# Patient Record
Sex: MALE | Race: WHITE | NOT HISPANIC OR LATINO | Employment: FULL TIME | URBAN - METROPOLITAN AREA
[De-identification: names, ages, dates, MRNs, and addresses within clinical notes are randomized per-mention and may not be internally consistent; named-entity substitution may affect disease eponyms.]

---

## 2024-10-25 ENCOUNTER — OFFICE VISIT (OUTPATIENT)
Dept: ORTHOPEDIC SURGERY | Facility: CLINIC | Age: 59
End: 2024-10-25
Payer: COMMERCIAL

## 2024-10-25 VITALS
HEART RATE: 80 BPM | WEIGHT: 210 LBS | OXYGEN SATURATION: 96 % | SYSTOLIC BLOOD PRESSURE: 160 MMHG | DIASTOLIC BLOOD PRESSURE: 90 MMHG

## 2024-10-25 DIAGNOSIS — M25.512 LEFT SHOULDER PAIN, UNSPECIFIED CHRONICITY: ICD-10-CM

## 2024-10-25 DIAGNOSIS — M75.42 IMPINGEMENT SYNDROME OF LEFT SHOULDER: Primary | ICD-10-CM

## 2024-10-25 RX ORDER — ROSUVASTATIN CALCIUM 5 MG/1
TABLET, COATED ORAL
COMMUNITY

## 2024-10-25 RX ORDER — LOSARTAN POTASSIUM 50 MG/1
TABLET ORAL
COMMUNITY

## 2024-10-25 RX ORDER — AMLODIPINE BESYLATE 2.5 MG/1
TABLET ORAL
COMMUNITY

## 2024-10-25 RX ORDER — IBUPROFEN 800 MG/1
800 TABLET, FILM COATED ORAL EVERY 6 HOURS PRN
COMMUNITY

## 2024-10-25 RX ORDER — PANTOPRAZOLE SODIUM 40 MG/1
40 TABLET, DELAYED RELEASE ORAL DAILY
COMMUNITY

## 2024-10-25 RX ADMIN — TRIAMCINOLONE ACETONIDE 40 MG: 40 INJECTION, SUSPENSION INTRA-ARTICULAR; INTRAMUSCULAR at 16:27

## 2024-10-25 RX ADMIN — LIDOCAINE HYDROCHLORIDE 5 ML: 10 INJECTION, SOLUTION INFILTRATION; PERINEURAL at 16:27

## 2024-10-25 NOTE — PROGRESS NOTES
Chief Complaint  Initial Evaluation of the Left Shoulder       Subjective      Luis Perry presents to [facility] for an evaluation  of his left shoulder. He has been having pain to his left shoulder for several months. He reports no specific injury, trauma, falls or surgery on his left shoulder. He has pain with overhead range of motion, lifting, pulling and reaching with his shoulder. He has been taking over the counter medications for pain control.      Allergies   Allergen Reactions    Cefdinir Rash, Swelling and Unknown (See Comments)    Penicillins Hives, Itching, Rash, Shortness Of Breath, Swelling and Unknown (See Comments)        Social History     Socioeconomic History    Marital status:    Tobacco Use    Smoking status: Never    Smokeless tobacco: Never   Vaping Use    Vaping status: Never Used   Substance and Sexual Activity    Alcohol use: Yes     Comment: social    Sexual activity: Defer        I reviewed the patient's chief complaint, history of present illness, review of systems, past medical history, surgical history, family history, social history, medications, and allergy list.     Review of Systems     Constitutional: Denies fevers, chills, weight loss  Cardiovascular: Denies chest pain, shortness of breath  Skin: Denies rashes, acute skin changes  Neurologic: Denies headache, loss of consciousness  MSK: Left shoulder pain       Vital Signs:   /90   Pulse 80   Wt 95.3 kg (210 lb)   SpO2 96%          Physical Exam  General: Alert. No acute distress    Ortho Exam        Left upper extremity: tender over the biceps and AC joint, forward elevation  to 150 degrees, 4 plus supraspinatus strength, 5/5 infraspinatus and subscap, positive impingement, mild pain with cross arm, neurovascularly intact, sensation intact to the medial, radial and ulnar nerve, negative  Crestline,     Large Joint: L subacromial bursa  Date/Time: 10/25/2024 4:27 PM  Consent  given by: patient  Site marked: site marked  Timeout: Immediately prior to procedure a time out was called to verify the correct patient, procedure, equipment, support staff and site/side marked as required   Supporting Documentation  Indications: pain   Procedure Details  Location: shoulder - L subacromial bursa  Preparation: Patient was prepped and draped in the usual sterile fashion  Needle gauge: 21g.  Medications administered: 5 mL lidocaine 1 %; 40 mg triamcinolone acetonide 40 MG/ML  Patient tolerance: patient tolerated the procedure well with no immediate complications    This injection documentation was Scribed for James Garcia MD by Lianne Wong MA.  10/25/24   16:27 EDT        Imaging Results (Most Recent)       Procedure Component Value Units Date/Time    XR Scapula Left [[id number]] Resulted: 10/25/24 1602     Updated: 10/25/24 1605             Result Review :     X-Ray Report:  Left scapula X-Ray  Indication: Evaluation of left shoulder pain   AP/Lateral view(s)  Findings: mild degenerative changes to the glenohumeral joint, moderate degenerative changes to the AC, no acute fracture, no dislocation   Prior studies available for comparison: no       No results found.           Assessment and Plan     Diagnoses and all orders for this visit:    1. Left shoulder pain, unspecified chronicity (Primary)  -     XR Scapula Left    2. Impingement syndrome of left shoulder      The patient presents here today for an evaluation  of his left shoulder. X-rays were obtained in the office today and these were reviewed today.     Discussed the risks and benefits of a left shoulder steroid injection today in the office. Patient expressed understanding and wishes to proceed. He tolerated the injection well and without any complications.      Home exercises given today and will continue over the counter medications.     May consider an MRI in the future if symptoms persist.     Call or return if worsening  symptoms.    Follow Up     4 - 6 weeks       Patient was given instructions and counseling regarding his condition or for health maintenance advice. Please see specific information pulled into the AVS if appropriate.     Scribed for James Garcia MD by Orin Sorto.  10/25/24   16:11 EDT    I have personally performed the services described in this document as scribed by the above individual and it is both accurate and complete. James Garcia MD 10/28/24

## 2024-10-28 RX ORDER — TRIAMCINOLONE ACETONIDE 40 MG/ML
40 INJECTION, SUSPENSION INTRA-ARTICULAR; INTRAMUSCULAR
Status: COMPLETED | OUTPATIENT
Start: 2024-10-25 | End: 2024-10-25

## 2024-10-28 RX ORDER — LIDOCAINE HYDROCHLORIDE 10 MG/ML
5 INJECTION, SOLUTION INFILTRATION; PERINEURAL
Status: COMPLETED | OUTPATIENT
Start: 2024-10-25 | End: 2024-10-25

## 2024-11-11 ENCOUNTER — TELEPHONE (OUTPATIENT)
Dept: ORTHOPEDIC SURGERY | Facility: CLINIC | Age: 59
End: 2024-11-11

## 2024-11-11 NOTE — TELEPHONE ENCOUNTER
"Name: Luis Perry \"Bossman\"      Relationship: Self      Best Callback Number: [phone]      HUB PROVIDED THE RELAY MESSAGE FROM THE OFFICE      PATIENT: VOICED UNDERSTANDING AND HAS NO FURTHER QUESTIONS AT THIS TIME    ADDITIONAL INFORMATION:   "

## 2024-12-09 ENCOUNTER — OFFICE VISIT (OUTPATIENT)
Dept: ORTHOPEDIC SURGERY | Facility: CLINIC | Age: 59
End: 2024-12-09
Payer: COMMERCIAL

## 2024-12-09 VITALS — OXYGEN SATURATION: 97 % | SYSTOLIC BLOOD PRESSURE: 151 MMHG | HEART RATE: 74 BPM | DIASTOLIC BLOOD PRESSURE: 89 MMHG

## 2024-12-09 DIAGNOSIS — M25.512 LEFT SHOULDER PAIN, UNSPECIFIED CHRONICITY: Primary | ICD-10-CM

## 2024-12-09 DIAGNOSIS — M75.42 IMPINGEMENT SYNDROME OF LEFT SHOULDER: ICD-10-CM

## 2024-12-09 NOTE — PROGRESS NOTES
"Chief Complaint  Follow-up of the Left Shoulder     Subjective      Luis Perry presents to [facility] for follow up of the left shoulder. He had a left shoulder steroid injection on 10/25/24. The pain decreased for 2 weeks.   He reports no specific injury, trauma, falls or surgery on his left shoulder. He has pain with overhead range of motion, lifting, pulling and reaching with his shoulder. He has been taking over the counter medications for pain control.  He notes the pain wakes him up while sleeping.  He has done home exercises that has failed.      Allergies   Allergen Reactions    Cefdinir Rash, Swelling and Unknown (See Comments)    Penicillins Hives, Itching, Rash, Shortness Of Breath, Swelling and Unknown (See Comments)    Meloxicam Itching     \"I had a reaction to it\"        Social History     Socioeconomic History    Marital status:    Tobacco Use    Smoking status: Never    Smokeless tobacco: Never   Vaping Use    Vaping status: Never Used   Substance and Sexual Activity    Alcohol use: Yes     Comment: social    Sexual activity: Defer        I reviewed the patient's chief complaint, history of present illness, review of systems, past medical history, surgical history, family history, social history, medications, and allergy list.     Review of Systems     Constitutional: Denies fevers, chills, weight loss  Cardiovascular: Denies chest pain, shortness of breath  Skin: Denies rashes, acute skin changes  Neurologic: Denies headache, loss of consciousness        Vital Signs:   /89   Pulse 74   SpO2 97%          Physical Exam  General: Alert. No acute distress    Ortho Exam        Left upper extremity: Forward flexion 170. Abduction 100. External rotation 50. Internal rotation L3. Positive Cross body adduction. Supraspinatus strength 4+/5. Infraspinatus Strength 5/5. Infrared subscap 5/5. Positive Mejia. Positive Neer. Negative Apprehension. Negative Lift " off. (Negative Obriens. Sensation intact to light touch, median, radial, ulnar nerve. Positive AIN, PIN, ulnar nerve motor. Positive pulses. Positive Impingement signs. Good strength in triceps, biceps, deltoid, wrist extensors and wrist flexors. Tender to palpation to  the anterior aspect of the shoulder and down the arm.  Pain with empty can testing.      Procedures        Imaging Results (Most Recent)       None             Result Review :             Assessment and Plan     Diagnoses and all orders for this visit:    1. Impingement syndrome of left shoulder (Primary)    2. Left shoulder pain, unspecified chronicity        Discussed the treatment plan with the patient.     MRI of the left shoulder.        Call or return if worsening symptoms.    Follow Up     After MRI of the left shoulder.       Patient was given instructions and counseling regarding his condition or for health maintenance advice. Please see specific information pulled into the AVS if appropriate.     Scribed for James Garcia MD by Ericka Winston MA.  12/09/24   16:06 EST      I have personally performed the services described in this document as scribed by the above individual and it is both accurate and complete. James Garcia MD 12/10/24

## 2025-01-22 ENCOUNTER — HOSPITAL ENCOUNTER (OUTPATIENT)
Dept: MRI IMAGING | Facility: HOSPITAL | Age: 60
Discharge: HOME | End: 2025-01-22
Admitting: ORTHOPAEDIC SURGERY
Payer: COMMERCIAL

## 2025-01-22 DIAGNOSIS — M25.512 LEFT SHOULDER PAIN, UNSPECIFIED CHRONICITY: ICD-10-CM

## 2025-01-22 PROCEDURE — 73221 MRI JOINT UPR EXTREM W/O DYE: CPT

## 2025-01-27 ENCOUNTER — PREP FOR SURGERY (OUTPATIENT)
Dept: OTHER | Facility: HOSPITAL | Age: 60
End: 2025-01-27
Payer: COMMERCIAL

## 2025-01-27 ENCOUNTER — OFFICE VISIT (OUTPATIENT)
Dept: ORTHOPEDIC SURGERY | Facility: CLINIC | Age: 60
End: 2025-01-27
Payer: COMMERCIAL

## 2025-01-27 VITALS
SYSTOLIC BLOOD PRESSURE: 157 MMHG | DIASTOLIC BLOOD PRESSURE: 90 MMHG | WEIGHT: 210 LBS | BODY MASS INDEX: 30.06 KG/M2 | HEART RATE: 82 BPM | HEIGHT: 70 IN | OXYGEN SATURATION: 97 %

## 2025-01-27 DIAGNOSIS — M25.512 LEFT SHOULDER PAIN, UNSPECIFIED CHRONICITY: Primary | ICD-10-CM

## 2025-01-27 DIAGNOSIS — M75.112 INCOMPLETE TEAR OF LEFT ROTATOR CUFF, UNSPECIFIED WHETHER TRAUMATIC: ICD-10-CM

## 2025-01-27 DIAGNOSIS — M75.42 IMPINGEMENT SYNDROME OF LEFT SHOULDER: Primary | ICD-10-CM

## 2025-01-27 DIAGNOSIS — M75.42 IMPINGEMENT SYNDROME OF LEFT SHOULDER: ICD-10-CM

## 2025-01-27 PROBLEM — M75.102 ROTATOR CUFF TEAR, LEFT: Status: ACTIVE | Noted: 2025-01-27

## 2025-01-27 PROCEDURE — 99214 OFFICE O/P EST MOD 30 MIN: CPT | Performed by: ORTHOPAEDIC SURGERY

## 2025-01-27 RX ORDER — CLINDAMYCIN PHOSPHATE 900 MG/50ML
900 INJECTION, SOLUTION INTRAVENOUS ONCE
OUTPATIENT
Start: 2025-01-27 | End: 2025-01-27

## 2025-01-27 NOTE — PROGRESS NOTES
"Chief Complaint  Follow-up of the Left Shoulder     Subjective      Luis Perry presents to [facility] for follow up of the left shoulder.  He had a MRI and is here to review.  . He had a left shoulder steroid injection on 10/25/24.  He reports no specific injury, trauma, falls or surgery on his left shoulder. He has pain with overhead range of motion, lifting, pulling and reaching with his shoulder. He has been taking over the counter medications for pain control.  He notes the pain wakes him up while sleeping.  He has done home exercises that has failed.      Allergies   Allergen Reactions    Cefdinir Rash, Swelling and Unknown (See Comments)    Penicillins Hives, Itching, Rash, Shortness Of Breath, Swelling and Unknown (See Comments)    Meloxicam Itching     \"I had a reaction to it\"        Social History     Socioeconomic History    Marital status:    Tobacco Use    Smoking status: Never    Smokeless tobacco: Never   Vaping Use    Vaping status: Never Used   Substance and Sexual Activity    Alcohol use: Yes     Alcohol/week: 1.0 - 2.0 standard drink of alcohol     Types: 1 - 2 Cans of beer per week     Comment: social    Drug use: Never    Sexual activity: Yes     Partners: Female     Birth control/protection: None        I reviewed the patient's chief complaint, history of present illness, review of systems, past medical history, surgical history, family history, social history, medications, and allergy list.     Review of Systems     Constitutional: Denies fevers, chills, weight loss  Cardiovascular: Denies chest pain, shortness of breath  Skin: Denies rashes, acute skin changes  Neurologic: Denies headache, loss of consciousness      Vital Signs:   /90   Pulse 82   Ht 177.8 cm (70\")   Wt 95.3 kg (210 lb)   SpO2 97%   BMI 30.13 kg/m²          Physical Exam  General: Alert. No acute distress    Ortho Exam        Left upper extremity: Forward flexion 170. " Abduction 100. External rotation 50. Internal rotation L3. Positive Cross body adduction. Supraspinatus strength 4+/5. Infraspinatus Strength 5/5. Infrared subscap 5/5. Positive Mejia. Positive Neer. Negative Apprehension. Negative Lift off. (Negative Obriens. Sensation intact to light touch, median, radial, ulnar nerve. Positive AIN, PIN, ulnar nerve motor. Positive pulses. Positive Impingement signs. Good strength in triceps, biceps, deltoid, wrist extensors and wrist flexors. Tender to palpation to  the anterior aspect of the shoulder and down the arm.  Pain with empty can testing.        Procedures        Imaging Results (Most Recent)       None             Result Review :         MRI Shoulder Left Without Contrast    Result Date: 1/23/2025  Narrative: MRI SHOULDER LEFT WO CONTRAST Date of Exam: 1/22/2025 4:59 PM EST Indication: bursitis of left shoulder.  Comparison: None available. Technique:  Routine multiplanar/multisequence images of the left shoulder were obtained without contrast administration.  Findings: Changes of tendinopathy are seen throughout the rotator cuff. There is a superimposed full-thickness tear of the distal anterior fibers of the supraspinatus component. The tear measures approximately 5 mm. Increased overlying bursal fluid is noted. The biceps anchor is intact. There is no evidence for complete biceps tendon tear or distal retraction. The tendon is identified in the expected position in the intertubercular sulcus. There is abnormal signal involving the proximal tendon suggesting tendinopathy and partial-thickness longitudinal split tear. The glenohumeral joint is intact. Moderate changes of osteoarthritis are noted. There is abnormal signal and irregularity throughout the labrum likely related to chronic degenerative type changes. No definitive SLAP type tear is seen. There is no joint effusion. Slightly increased subacromial/subdeltoid bursal fluid is noted. The acromioclavicular  joint is intact. Mild hypertrophic age-related changes are noted. No significant focal abnormal bone marrow signal is seen. The cortical margins are grossly intact. There is mild atrophy throughout the rotator cuff musculature.     Impression: Impression: 1.Changes of tendinopathy are seen throughout the rotator cuff. There is a superimposed full-thickness tear of the distal anterior fibers of the supraspinatus component measuring approximately 5 mm. Increased overlying bursal fluid is noted. 2.No evidence for complete biceps tendon tear or distal retraction. There is evidence for tendinopathy and partial-thickness longitudinal split tear of the proximal tendon. 3.Moderate osteoarthritis of the glenohumeral joint. Associated chronic degenerative type changes of the labrum are noted. Electronically Signed: Andrews Palma MD  1/23/2025 10:02 AM EST  Workstation ID: [id number]            Assessment and Plan     Diagnoses and all orders for this visit:    1. Left shoulder pain, unspecified chronicity (Primary)    2. Impingement syndrome of left shoulder    3. Incomplete tear of left rotator cuff, unspecified whether traumatic        Discussed the treatment plan with the patient. I reviewed the MRI results with the patient.     Discussed the treatment options with the patient, operative vs non-operative. The patient is a candidate for a left shoulder arthroscopy whenever he is ready.     Discussed conservative measures as injections, therapy and anti inflammatory.      The patient expressed understanding and wished to proceed with a left shoulder arthroscopy.      Discussed surgery., Risks/benefits discussed with patient including, but not limited to: infection, bleeding, neurovascular damage, re-rupture, aesthetic deformity, need for further surgery, and death., Surgery pamphlet given., and Call or return if worsening symptoms.    Follow Up     2 weeks postoperatively       Patient was given instructions and counseling  regarding his condition or for health maintenance advice. Please see specific information pulled into the AVS if appropriate.     Scribed for James Garcia MD by Ericka Winston MA.  01/27/25   15:39 EST      I have personally performed the services described in this document as scribed by the above individual and it is both accurate and complete. James Garcia MD 01/27/25

## 2025-02-07 ENCOUNTER — TELEPHONE (OUTPATIENT)
Dept: ORTHOPEDIC SURGERY | Facility: CLINIC | Age: 60
End: 2025-02-07
Payer: COMMERCIAL

## 2025-02-07 NOTE — TELEPHONE ENCOUNTER
"HUB AGENT ATTEMPTED NON-CLINICAL WARM TRANSFER - NO ANSWER     Caller: Luis Perry \"Bossman\" / PATIENT     Best call back number: [phone]    PLEASE NOTIFY PATIENT IF HIS INITIAL POST OP NOW SCHEDULED MON 03/17 @ 1600 WILL NEED TO BE MOVED BACK TO WEEK OF WED 03/12?     (WHEN IT WAS ORIGINALLY SCHEDULED DUE TO USUAL 2 WEEK POST OP TIMEFRAME)     PATIENT REQUESTED RESCHEDULE TO MON 03/17 AFTER SPEAKING w/HIS WIFE &      ALSO, PATIENT SENT [employer] MESSAGE REQUESTING RESCHEDULE TO WED 3/19 BEFORE CALLING HUB / OFC - PATIENT STATED PLEASE DISREGARD THAT [employer] MESSAGE SINCE HE LISTED INCORRECT DATE & HAS ALREADY BEEN RESCHEDULED     THANKS  "

## 2025-02-27 ENCOUNTER — TELEPHONE (OUTPATIENT)
Dept: ORTHOPEDIC SURGERY | Facility: CLINIC | Age: 60
End: 2025-02-27
Payer: COMMERCIAL

## 2025-02-27 NOTE — TELEPHONE ENCOUNTER
SPOKE WITH PATIENT, LET HIM KNOW THAT HE WOULD HAVE TO TEST NEGATIVE 5 DAYS AFTER TESTING POSITIVE TO PROCEED WITH SURGERY. ALSO HIS SYMPTOMS WOULD NEED TO BE RESOLVED SO HE IS SAFE TO GO UNDER ANESTHESIA. HE STATES HE WOULD LIKE TO STAY ON THE SCHEDULE, AND RE-TEST EARLY NEXT WEEK AND LET US KNOW THE RESULTS. HE STATES HE IS FEELING PRETTY GOOD TODAY BUT WILL MONITOR SYMPTOMS FOR SIGNS OF WORSENING AND LET US KNOW IF SURGERY NEEDS TO BE RESCHEDULED.

## 2025-02-27 NOTE — TELEPHONE ENCOUNTER
Caller: REGINALDO JENKINS    Phone Number: [phone] (home)     Reason for Call:   PATIENT TOOK AN AT HOME COVID TEST LAST NIGHT AND TESTED POSITIVE. PATIENT HAS SURGERY SCHEDULED FOR 3-6-25 AND WANTED TO KNOW IF HE WAS OKAY TO PROCEED WITH SURGERY OR IF HE WILL NEED TO RESCHEDULE?

## 2025-03-03 ENCOUNTER — TELEPHONE (OUTPATIENT)
Dept: ORTHOPEDIC SURGERY | Facility: CLINIC | Age: 60
End: 2025-03-03
Payer: COMMERCIAL

## 2025-03-05 ENCOUNTER — ANESTHESIA EVENT (OUTPATIENT)
Dept: PERIOP | Facility: HOSPITAL | Age: 60
End: 2025-03-05
Payer: COMMERCIAL

## 2025-03-05 RX ORDER — MULTIPLE VITAMINS W/ MINERALS TAB 9MG-400MCG
1 TAB ORAL DAILY
COMMUNITY

## 2025-03-05 RX ORDER — TRIAMCINOLONE ACETONIDE 55 UG/1
2 SPRAY, METERED NASAL DAILY PRN
COMMUNITY

## 2025-03-05 NOTE — PRE-PROCEDURE INSTRUCTIONS
PATIENT INSTRUCTED TO BE:    - NOTHING TO EAT AFTER MIDNIGHT OR CHEW, EXCEPT CAN HAVE SIPS OF WATER WITH MEDICATIONS OR CLEAR LIQUIDS 2 HOURS PRIOR TO SURGERY ARRIVAL TIME , NO MORE THAN 8 OZ. (NOTHING RED)     - TO HOLD ALL VITAMINS, SUPPLEMENTS, NSAIDS FOR ONE WEEK PRIOR TO THEIR SURGICAL PROCEDURE LAST DOSE 3/1/25    - DO NOT TAKE ______________________ 7 DAYS PRIOR TO PROCEDURE PER ANESTHESIA RECOMMENDATIONS/INSTRUCTIONS     - INSTRUCTED PT TO USE SURGICAL SOAP 1 TIME THE NIGHT PRIOR TO SURGERY ___________ OR THE AM OF SURGERY _____________   USE THE SOAP FROM NECK TO TOES, AVOID THEIR FACE, HAIR, AND PRIVATE PARTS. IF USE THE SOAP THE NIGHT PRIOR TO SURGERY, CHANGE BED LINENS AND NO PETS IN THE BED.     INSTRUCTED NO LOTIONS, JEWELRY, PIERCINGS,  NAIL POLISH, OR DEODORANT DAY OF SURGERY    - IF DIABETIC, CHECK BLOOD GLUCOSE IF LESS THAN 70 OR HAVING SYMPTOMS CALL THE PREOP AREA FOR INSTRUCTIONS ON AM OF SURGERY ([phone])    -INSTRUCTED TO TAKE THE FOLLOWING MEDICATIONS THE DAY OF SURGERY WITH SIPS OF WATER:     PANTOPRAZOLE, CRESTOR, NASACORT AS NEEDED.      - DO NOT BRING ANY MEDICATIONS WITH YOU TO THE HOSPITAL THE DAY OF SURGERY, EXCEPT IF USE INHALERS. BRING INHALERS DAY OF SURGERY       - BRING CPAP OR BIPAP TO THE HOSPITAL ONLY IF YOU ARE SPENDING THE NIGHT    - DO NOT SMOKE OR VAPE 24 HOURS PRIOR TO PROCEDURE PER ANESTHESIA REQUEST     -MAKE SURE YOU HAVE A RIDE HOME OR SOMEONE TO STAY WITH YOU THE DAY OF THE PROCEDURE AFTER YOU GO HOME     - FOLLOW ANY OTHER INSTRUCTIONS GIVEN TO YOU BY YOUR SURGEON'S OFFICE.     - DAY OF SURGERY ____________ AT [facility] ([facility]),  PARK IN THE OPEN LOT, COME TO [facility]/ [facility], FIRST FLOOR, CHECK IN AT THE DESK FOR REGISTRATION/ SURGERY      - YOU WILL RECEIVE A PHONE CALL THE DAY PRIOR TO SURGERY BETWEEN 1PM AND 4 PM WITH ARRIVAL TIME, IF YOUR SURGERY IS ON A MONDAY YOU WILL RECEIVE A CALL THE FRIDAY PRIOR TO SURGERY DATE    - BRING  CASH OR CREDIT CARD FOR COPAYMENT OF MEDICATIONS AFTER SURGERY IF YOU USE THE HOSPITAL PHARMACY ([facility] TO BED)    - PREADMISSION TESTING NURSE [phone] IF HAVE ANY QUESTIONS     -PATIENT PROVIDED THE NUMBER FOR PREOP SURGICAL DEPT IF HAD QUESTIONS AFTER HOURS PRIOR TO SURGERY ([phone]).  INFORMED PT IF NO ANSWER, LEAVE A MESSAGE AND SOMEONE WILL RETURN THEIR CALL       PATIENT VERBALIZED UNDERSTANDING

## 2025-03-06 ENCOUNTER — ANESTHESIA (OUTPATIENT)
Dept: PERIOP | Facility: HOSPITAL | Age: 60
End: 2025-03-06
Payer: COMMERCIAL

## 2025-03-06 ENCOUNTER — ANESTHESIA EVENT CONVERTED (OUTPATIENT)
Dept: ANESTHESIOLOGY | Facility: HOSPITAL | Age: 60
End: 2025-03-06
Payer: COMMERCIAL

## 2025-03-06 ENCOUNTER — HOSPITAL ENCOUNTER (OUTPATIENT)
Facility: HOSPITAL | Age: 60
Discharge: HOME | End: 2025-03-06
Attending: ORTHOPAEDIC SURGERY | Admitting: ORTHOPAEDIC SURGERY
Payer: COMMERCIAL

## 2025-03-06 VITALS
OXYGEN SATURATION: 95 % | HEIGHT: 70 IN | WEIGHT: 207.23 LBS | BODY MASS INDEX: 29.67 KG/M2 | SYSTOLIC BLOOD PRESSURE: 145 MMHG | TEMPERATURE: 96.9 F | RESPIRATION RATE: 16 BRPM | HEART RATE: 68 BPM | DIASTOLIC BLOOD PRESSURE: 96 MMHG

## 2025-03-06 DIAGNOSIS — M75.42 IMPINGEMENT SYNDROME OF LEFT SHOULDER: ICD-10-CM

## 2025-03-06 PROCEDURE — 25810000003 LACTATED RINGERS PER 1000 ML: Performed by: ANESTHESIOLOGY

## 2025-03-06 PROCEDURE — 25010000002 LIDOCAINE PF 2% 2 % SOLUTION: Performed by: NURSE ANESTHETIST, CERTIFIED REGISTERED

## 2025-03-06 PROCEDURE — 23412 REPAIR ROTATOR CUFF CHRONIC: CPT | Performed by: ORTHOPAEDIC SURGERY

## 2025-03-06 PROCEDURE — 25010000002 SUGAMMADEX 200 MG/2ML SOLUTION: Performed by: NURSE ANESTHETIST, CERTIFIED REGISTERED

## 2025-03-06 PROCEDURE — 25010000002 ONDANSETRON PER 1 MG: Performed by: NURSE ANESTHETIST, CERTIFIED REGISTERED

## 2025-03-06 PROCEDURE — 25010000002 PROPOFOL 10 MG/ML EMULSION: Performed by: NURSE ANESTHETIST, CERTIFIED REGISTERED

## 2025-03-06 PROCEDURE — 25010000002 DEXAMETHASONE PER 1 MG: Performed by: NURSE ANESTHETIST, CERTIFIED REGISTERED

## 2025-03-06 PROCEDURE — 25010000002 CLINDAMYCIN 900 MG/50ML SOLUTION: Performed by: ORTHOPAEDIC SURGERY

## 2025-03-06 PROCEDURE — 25010000002 ROPIVACAINE PER 1 MG: Performed by: ANESTHESIOLOGY

## 2025-03-06 PROCEDURE — S0260 H&P FOR SURGERY: HCPCS | Performed by: ORTHOPAEDIC SURGERY

## 2025-03-06 PROCEDURE — 25010000002 FENTANYL CITRATE (PF) 50 MCG/ML SOLUTION: Performed by: NURSE ANESTHETIST, CERTIFIED REGISTERED

## 2025-03-06 PROCEDURE — 25010000002 MIDAZOLAM PER 1MG: Performed by: ANESTHESIOLOGY

## 2025-03-06 PROCEDURE — 29824 SHO ARTHRS SRG DSTL CLAVICLC: CPT | Performed by: ORTHOPAEDIC SURGERY

## 2025-03-06 PROCEDURE — C1713 ANCHOR/SCREW BN/BN,TIS/BN: HCPCS | Performed by: ORTHOPAEDIC SURGERY

## 2025-03-06 DEVICE — SUT/ANCH BIOCOMP CORKSCREW FUL/THRD SUTURETAPE 5.5X14.7MM: Type: IMPLANTABLE DEVICE | Site: SHOULDER | Status: FUNCTIONAL

## 2025-03-06 DEVICE — SUT/ANCH BIOCOMP SWIVELOCK KNOTLSS NMBR2/BLU 4.75X19.1MM: Type: IMPLANTABLE DEVICE | Site: SHOULDER | Status: FUNCTIONAL

## 2025-03-06 DEVICE — SUT FW 2/0 38IN 1BLU 1BLK/WHT  AR7201: Type: IMPLANTABLE DEVICE | Site: SHOULDER | Status: FUNCTIONAL

## 2025-03-06 RX ORDER — PROMETHAZINE HYDROCHLORIDE 12.5 MG/1
25 TABLET ORAL ONCE AS NEEDED
Status: DISCONTINUED | OUTPATIENT
Start: 2025-03-06 | End: 2025-03-06 | Stop reason: HOSPADM

## 2025-03-06 RX ORDER — ONDANSETRON 2 MG/ML
4 INJECTION INTRAMUSCULAR; INTRAVENOUS ONCE AS NEEDED
Status: DISCONTINUED | OUTPATIENT
Start: 2025-03-06 | End: 2025-03-06 | Stop reason: HOSPADM

## 2025-03-06 RX ORDER — ACETAMINOPHEN 500 MG
1000 TABLET ORAL ONCE
Status: COMPLETED | OUTPATIENT
Start: 2025-03-06 | End: 2025-03-06

## 2025-03-06 RX ORDER — ROPIVACAINE HYDROCHLORIDE 5 MG/ML
INJECTION, SOLUTION EPIDURAL; INFILTRATION; PERINEURAL
Status: COMPLETED | OUTPATIENT
Start: 2025-03-06 | End: 2025-03-06

## 2025-03-06 RX ORDER — PROMETHAZINE HYDROCHLORIDE 25 MG/1
25 SUPPOSITORY RECTAL ONCE AS NEEDED
Status: DISCONTINUED | OUTPATIENT
Start: 2025-03-06 | End: 2025-03-06 | Stop reason: HOSPADM

## 2025-03-06 RX ORDER — FENTANYL CITRATE 50 UG/ML
INJECTION, SOLUTION INTRAMUSCULAR; INTRAVENOUS AS NEEDED
Status: DISCONTINUED | OUTPATIENT
Start: 2025-03-06 | End: 2025-03-06 | Stop reason: SURG

## 2025-03-06 RX ORDER — SODIUM CHLORIDE, SODIUM LACTATE, POTASSIUM CHLORIDE, CALCIUM CHLORIDE 600; 310; 30; 20 MG/100ML; MG/100ML; MG/100ML; MG/100ML
9 INJECTION, SOLUTION INTRAVENOUS CONTINUOUS PRN
Status: DISCONTINUED | OUTPATIENT
Start: 2025-03-06 | End: 2025-03-06 | Stop reason: HOSPADM

## 2025-03-06 RX ORDER — PROPOFOL 10 MG/ML
VIAL (ML) INTRAVENOUS AS NEEDED
Status: DISCONTINUED | OUTPATIENT
Start: 2025-03-06 | End: 2025-03-06 | Stop reason: SURG

## 2025-03-06 RX ORDER — DEXAMETHASONE SODIUM PHOSPHATE 4 MG/ML
INJECTION, SOLUTION INTRA-ARTICULAR; INTRALESIONAL; INTRAMUSCULAR; INTRAVENOUS; SOFT TISSUE AS NEEDED
Status: DISCONTINUED | OUTPATIENT
Start: 2025-03-06 | End: 2025-03-06 | Stop reason: SURG

## 2025-03-06 RX ORDER — HYDROCODONE BITARTRATE AND ACETAMINOPHEN 7.5; 325 MG/1; MG/1
1 TABLET ORAL EVERY 6 HOURS PRN
Qty: 45 TABLET | Refills: 0 | Status: SHIPPED | OUTPATIENT
Start: 2025-03-06

## 2025-03-06 RX ORDER — OXYCODONE HYDROCHLORIDE 5 MG/1
5 TABLET ORAL
Status: DISCONTINUED | OUTPATIENT
Start: 2025-03-06 | End: 2025-03-06 | Stop reason: HOSPADM

## 2025-03-06 RX ORDER — CLINDAMYCIN PHOSPHATE 900 MG/50ML
900 INJECTION, SOLUTION INTRAVENOUS ONCE
Status: COMPLETED | OUTPATIENT
Start: 2025-03-06 | End: 2025-03-06

## 2025-03-06 RX ORDER — ONDANSETRON 2 MG/ML
INJECTION INTRAMUSCULAR; INTRAVENOUS AS NEEDED
Status: DISCONTINUED | OUTPATIENT
Start: 2025-03-06 | End: 2025-03-06 | Stop reason: SURG

## 2025-03-06 RX ORDER — ROCURONIUM BROMIDE 10 MG/ML
INJECTION, SOLUTION INTRAVENOUS AS NEEDED
Status: DISCONTINUED | OUTPATIENT
Start: 2025-03-06 | End: 2025-03-06 | Stop reason: SURG

## 2025-03-06 RX ORDER — MIDAZOLAM HYDROCHLORIDE 2 MG/2ML
INJECTION, SOLUTION INTRAMUSCULAR; INTRAVENOUS AS NEEDED
Status: DISCONTINUED | OUTPATIENT
Start: 2025-03-06 | End: 2025-03-06 | Stop reason: SURG

## 2025-03-06 RX ORDER — LIDOCAINE HYDROCHLORIDE 20 MG/ML
INJECTION, SOLUTION EPIDURAL; INFILTRATION; INTRACAUDAL; PERINEURAL AS NEEDED
Status: DISCONTINUED | OUTPATIENT
Start: 2025-03-06 | End: 2025-03-06 | Stop reason: SURG

## 2025-03-06 RX ADMIN — ROCURONIUM BROMIDE 10 MG: 10 INJECTION, SOLUTION INTRAVENOUS at 13:27

## 2025-03-06 RX ADMIN — PROPOFOL 20 MG: 10 INJECTION, EMULSION INTRAVENOUS at 13:14

## 2025-03-06 RX ADMIN — FENTANYL CITRATE 50 MCG: 50 INJECTION, SOLUTION INTRAMUSCULAR; INTRAVENOUS at 13:14

## 2025-03-06 RX ADMIN — ROPIVACAINE HYDROCHLORIDE 30 ML: 5 INJECTION, SOLUTION EPIDURAL; INFILTRATION; PERINEURAL at 12:30

## 2025-03-06 RX ADMIN — SODIUM CHLORIDE, SODIUM LACTATE, POTASSIUM CHLORIDE, CALCIUM CHLORIDE 9 ML/HR: 20; 30; 600; 310 INJECTION, SOLUTION INTRAVENOUS at 11:55

## 2025-03-06 RX ADMIN — PROPOFOL 180 MG: 10 INJECTION, EMULSION INTRAVENOUS at 13:11

## 2025-03-06 RX ADMIN — FENTANYL CITRATE 50 MCG: 50 INJECTION, SOLUTION INTRAMUSCULAR; INTRAVENOUS at 13:09

## 2025-03-06 RX ADMIN — MIDAZOLAM HYDROCHLORIDE 4 MG: 1 INJECTION, SOLUTION INTRAMUSCULAR; INTRAVENOUS at 12:33

## 2025-03-06 RX ADMIN — ROCURONIUM BROMIDE 40 MG: 10 INJECTION, SOLUTION INTRAVENOUS at 13:11

## 2025-03-06 RX ADMIN — SUGAMMADEX 200 MG: 100 INJECTION, SOLUTION INTRAVENOUS at 14:20

## 2025-03-06 RX ADMIN — CLINDAMYCIN PHOSPHATE 900 MG: 900 INJECTION, SOLUTION INTRAVENOUS at 13:14

## 2025-03-06 RX ADMIN — ONDANSETRON 4 MG: 2 INJECTION INTRAMUSCULAR; INTRAVENOUS at 13:24

## 2025-03-06 RX ADMIN — DEXAMETHASONE SODIUM PHOSPHATE 4 MG: 4 INJECTION, SOLUTION INTRAMUSCULAR; INTRAVENOUS at 13:16

## 2025-03-06 RX ADMIN — LIDOCAINE HYDROCHLORIDE 60 MG: 20 INJECTION, SOLUTION INTRAVENOUS at 13:09

## 2025-03-06 RX ADMIN — ACETAMINOPHEN 1000 MG: 500 TABLET ORAL at 11:54

## 2025-03-06 RX ADMIN — SODIUM CHLORIDE, SODIUM LACTATE, POTASSIUM CHLORIDE, CALCIUM CHLORIDE: 20; 30; 600; 310 INJECTION, SOLUTION INTRAVENOUS at 14:26

## 2025-03-06 NOTE — OP NOTE
SHOULDER ARTHROSCOPY WITH ROTATOR CUFF REPAIR  Procedure Report    Patient Name:  Luis Perry  YOB: 1965    Date of Surgery:  3/6/2025       Pre-op Diagnosis:   Impingement syndrome of left shoulder [M75.42]       Post-Op Diagnosis Codes:     * Impingement syndrome of left shoulder [M75.42]    Procedure/CPT® Codes:  [place] SURGICAL ARTHROSCOPY SHOULDER W/ROTATOR CUFF RPR [62450]    Procedure(s):  LEFT SHOULDER ARTHROSCOPY subacromial decompression distal claviculectomy and mini open rotator cuff repair staff:  Surgeon(s):  James Garcia MD    Assistant: Opal Ocampo    Anesthesia: General with Block    Estimated Blood Loss: 20 mL    Implants:    Implant Name Type Inv. Item Serial No.  Lot No. LRB No. Used Action   SUT FW 2/0 38IN 1BLU 1BLK/WHT  [id number] - [id number] Implant SUT FW 2/0 38IN 1BLU 1BLK/WHT  [id number]  [id number] Left 1 Implanted   SUT/ANCH BIOCOMP CORKSCREW FUL/THRD SUTURETAPE 5.5X14.7MM - [id number] Implant SUT/ANCH BIOCOMP CORKSCREW FUL/THRD SUTURETAPE 5.5X14.7MM  [employer] [id number] Left 1 Implanted   SUT/ANCH BIOCOMP SWIVELOCK KNOTLSS NMBR2/CARY 4.75X19.1MM - [id number] Implant SUT/ANCH BIOCOMP SWIVELOCK KNOTLSS NMBR2/CARY 4.75X19.1MM  [employer]X [id number] Left 1 Implanted       Specimen:          None      Complications: None    Description of Procedure:   The patient was taken to the operating room and placed supine on the operating table after interscalene block was done in preoperative holding. After general endotracheal anesthesia was established the shoulder was examined and there was full range of motion and no instability. The patient was placed in the right lateral decubitus position with axillary roll and well-padded down lower extremity on a beanbag. The beanbag was deflated, and the left shoulder and upper extremity were prepped and draped in standard fashion using alcohol and ChloraPrep. A standard posterolateral portal was established with a stab incision.  The blunt was entered into the glenohumeral joint atraumatically and an anterosuperior portal was established under direct visualization. Thorough examination of the shoulder ensued. The glenohumeral cartilage was well maintained. The biceps was intact and the labrum was intact. There were no loose bodies or synovitis. There was no significant chondromalacia of the glenohumeral joint. There was evidence of a full thickness supraspinatus rotator cuff tear. The camera was placed in the subacromial space. The bursectomy was completed through a lateral portal. The coracoacromial ligament was frayed and released with a VAPR wand. The undersurface of the acromion was prepared for acromioplasty using a VAPR wand and carried out using a bur and checked using the cutting block technique. The distal clavicle showed significant signs of AC arthrosis and 8-10 mm of bone was removed from the distal clavicle using a bur. The bed of the rotator cuff was prepared and debrided with a shaver. After preparing the rotator cuff bed for repair, the mini-open rotator cuff repair ensued using one bioabsorbable Arthrex anchor with double limb FiberWire and a convergent suture. The suture was passed with a scorpion needle through the rotator cuff in an alternating simple and horizontal mattress fashion and tied down and incorporated to a double row repair with a swivel lock. A stout repair of the rotator cuff was achieved. The wound was copiously irrigated. The deltoid was closed with a few figure-of-eight 0 Vicryl, deep fat with 0 Vicryl, subcutaneous with 2-0 Vicryl, and the skin was closed with nylon. Incisions were washed and dried. Sterile dressings were applied. The patient was placed in an abduction pillow and sling.       The patient tolerated the procedure well, was extubated and taken to the recovery room.       Jaems Garcia MD     Date: 3/6/2025  Time: 14:31 EST

## 2025-03-06 NOTE — DISCHARGE INSTRUCTIONS
DISCHARGE INSTRUCTIONS  Post-Operative  Arthroscopic Shoulder Surgery      For your surgery you had:  General anesthesia (you may have a sore throat for the first 24 hours)  IV sedation.  Local anesthesia  Monitored anesthesia care  You may experience dizziness, drowsiness, or light-headedness for several hours following surgery/procedure.  Do not stay alone today or tonight.  Limit your activity for 24 hours.  Resume your diet slowly.  Follow whatever special dietary instructions you may have been given by your doctor.  You should not drive or operate machinery or drink alcohol for 24 hours or while you are taking pain medication.  You should not sign legally binding documents.  Post-Operative Day #1 is counted as the 1st day after surgery.  [x] If you had a regional block, you will not have control of your arm for up to 12 hours.  Protect the arm with a sling or follow your physician's specific instructions.  Post-Operative Day #2 SATURDAY  Remove your dressing.  Under the dressing you will either have sutures or staples.  Change dressing once or twice daily.  Place a dry dressing or band-aids over the small incisions.  Prior to dressing change, wash your hands.  Post-Operative Day #4 MONDAY  You may shower.  During the shower, you may let the water hit the incision and roll down but do not scrub or place any soap on the incision.  Do not soak it.  Pat it dry and do not put any ointments on the incision unless directed by surgeon. Then replace the dry dressing.    General Instructions  [] Use ice pack to shoulder for 72 hours.  This can help with reducing swelling and pain relief.  CHANGE ICE PACKS EVERY 4 HOURS.  Never place ice directly on skin.  Avoid getting dressing wet.  Keep arm elevated with sling to decrease swelling and minimize throbbing pain.  The sling will provide support for your shoulder.  It is important to remove the sling 3-5 times daily to work on range-of-motion of the elbow, wrist and  hand.    After most shoulder surgeries, it is permissible to start exercises by slowing trying to walk fingers across a table.  While doing this support the affected arm at the elbow or closer to the shoulder.  You may also lean over and let the arm and hand do small or large circles or write the alphabet with your hanging arm to keep it loose.  It is normal to have some pain with these gentle exercises.  The exercises help reduce swelling and pain overall and help to avoid a stiff shoulder post-operatively.  It is okay to come out of the sling for showering or for certain activities of daily living but avoid any lifting or carrying with the affected arm until instructed by the physician especially if you have had a repair of the rotator cuff or some type of reconstructive procedure.  It is okay to come out of the sling and support the arm with a pillow if you remain sedentary.  In general, sling use is preferred following a repair or reconstruction for 4 weeks.  If you have had a SAD (sub acromial decompression), continue sling use more liberally because there was not a repair or reconstruction that could be harmed.          DISCHARGE INSTRUCTIONS  Post-Operative   Arthroscopic Shoulder Surgery      Exercise fingers for 10 minutes every hour while awake.  The physician will typically inform the family and the patient whether or not a repair or reconstruction could be harmed.  If you have had a rotator cuff repair or reconstructive procedure, do not let the arm drop down suddenly from an elevated position down to your side despite improvements made in pain and over the 3 months following repair and reconstruction.  It generally takes 8-12 weeks before the repair or reconstruction does not rely on sutures and anchors that are placed for the repair.  You are generally given a prescription for a narcotic medication.  Take as prescribed.  You may use over-the-counter anti-inflammatory medications such as Motrin or Aleve  for additional pain or fever reduction if not allergic.  If you are taking the pain medication prescribed, do not take Tylenol too unless you consult with the pharmacist. The pain medications generally have Tylenol in them and too much Tylenol can be harmful.  Sometimes it is recommended to take an anti-inflammatory in between doses of prescription pain medication if additional pain relief is needed.  Consult with your physician or your pharmacist before taking over-the-counter pain medications/anti-inflammatories.  It is not uncommon to have a fever post-operatively especially in the first 24-48 hours.  Anti-inflammatories can be used for fever reduction also.  It is normal to have some redness or irritation around skin sutures or staples, however, if you have any expanding areas of redness with a persistent fever and increasing pain notify the physician as soon as possible.  The incidence of blood clots is low following arthroscopic procedures, however, if you notice any increasing pain or swelling in your calves or legs, contact the physician as soon as possible.   It is difficult to sleep in the customary fashion following a shoulder surgery.  It is usually necessary to sleep with the shoulder above the level of the heart such as in a recliner, couch or with the head of the bed elevated.  This should slowly improve over the weeks following shoulder surgery.  If unable to urinate 6 to 8 hours after surgery or urinating frequently in small amounts, notify the physician or go to the nearest Emergency Room.  SPECIAL INSTRUCTIONS:        NOTIFY YOUR PHYSICIAN IF YOU EXPERIENCE:  Numbness of fingers.  Inability to move fingers.  Extreme coldness, paleness or blue dis-coloration of fingers.  Any pain other than from the incision, such as swelling of the arm that blocks circulation of fingers.  Follow verbal instructions from your doctor.  Medications per physician instructions as indicated on Discharge Medication  Information Sheet.  You should see Naomi Shaffer for follow-up care  on  March 19, 2025 at 2:00 PM   Phone number: __________________________________  Missing your appointment/follow-up could lead to serious complications  If you have an emergency and cannot reach your doctor, go to an Emergency Room nearest your home.

## 2025-03-06 NOTE — ANESTHESIA POSTPROCEDURE EVALUATION
Patient: Luis Perry    Procedure Summary       Date: 03/06/25 Room / Location: [facility]  /  [facility]    Anesthesia Start: 1304 Anesthesia Stop: 1430    Procedure: LEFT SHOULDER ARTHROSCOPY WITH ROTATOR CUFF REPAIR SUBACROMIAL DECOMPRESSION DISTAL CLAVICULECTOMY: Surgery using a joint camera to fix torn tendon and biceps in left shoulder (Left: Shoulder) Diagnosis:       Impingement syndrome of left shoulder      (Impingement syndrome of left shoulder [M75.42])    Surgeons: James Garcia MD Provider: Rambo Herron MD    Anesthesia Type: general, regional ASA Status: 2            Anesthesia Type: general, regional    Vitals  Vitals Value Taken Time   /94 03/06/25 1459   Temp 36.1 °C (96.9 °F) 03/06/25 1429   Pulse 71 03/06/25 1459   Resp 16 03/06/25 1459   SpO2 95 % 03/06/25 1459           Post Anesthesia Care and Evaluation    Patient location during evaluation: bedside  Patient participation: complete - patient participated  Level of consciousness: awake  Pain management: adequate    Airway patency: patent  PONV Status: none  Cardiovascular status: acceptable and stable  Respiratory status: acceptable  Hydration status: acceptable

## 2025-03-06 NOTE — H&P
"Chief Complaint  Follow-up of the Left Shoulder        Subjective  Luis Perry presents to [facility] for follow up of the left shoulder.  He had a MRI and is here to review.  . He had a left shoulder steroid injection on 10/25/24.  He reports no specific injury, trauma, falls or surgery on his left shoulder. He has pain with overhead range of motion, lifting, pulling and reaching with his shoulder. He has been taking over the counter medications for pain control.  He notes the pain wakes him up while sleeping.  He has done home exercises that has failed.       Allergies             Allergies   Allergen Reactions    Cefdinir Rash, Swelling and Unknown (See Comments)    Penicillins Hives, Itching, Rash, Shortness Of Breath, Swelling and Unknown (See Comments)    Meloxicam Itching       \"I had a reaction to it\"            Social History   Social History                Socioeconomic History    Marital status:    Tobacco Use    Smoking status: Never    Smokeless tobacco: Never   Vaping Use    Vaping status: Never Used   Substance and Sexual Activity    Alcohol use: Yes       Alcohol/week: 1.0 - 2.0 standard drink of alcohol       Types: 1 - 2 Cans of beer per week       Comment: social    Drug use: Never    Sexual activity: Yes       Partners: Female       Birth control/protection: None            I reviewed the patient's chief complaint, history of present illness, review of systems, past medical history, surgical history, family history, social history, medications, and allergy list.      Review of Systems      Constitutional: Denies fevers, chills, weight loss  Cardiovascular: Denies chest pain, shortness of breath  Skin: Denies rashes, acute skin changes  Neurologic: Denies headache, loss of consciousness        Vital Signs:   /90   Pulse 82   Ht 177.8 cm (70\")   Wt 95.3 kg (210 lb)   SpO2 97%   BMI 30.13 kg/m²           Physical Exam  General: Alert. No acute " distress     Ortho Exam          Left upper extremity: Forward flexion 170. Abduction 100. External rotation 50. Internal rotation L3. Positive Cross body adduction. Supraspinatus strength 4+/5. Infraspinatus Strength 5/5. Infrared subscap 5/5. Positive Mejia. Positive Neer. Negative Apprehension. Negative Lift off. (Negative Obriens. Sensation intact to light touch, median, radial, ulnar nerve. Positive AIN, PIN, ulnar nerve motor. Positive pulses. Positive Impingement signs. Good strength in triceps, biceps, deltoid, wrist extensors and wrist flexors. Tender to palpation to  the anterior aspect of the shoulder and down the arm.  Pain with empty can testing.          Procedures           Imaging Results (Most Recent)         None                   Result Review  :            MRI Shoulder Left Without Contrast     Result Date: 1/23/2025  Narrative: MRI SHOULDER LEFT WO CONTRAST Date of Exam: 1/22/2025 4:59 PM EST Indication: bursitis of left shoulder.  Comparison: None available. Technique:  Routine multiplanar/multisequence images of the left shoulder were obtained without contrast administration.  Findings: Changes of tendinopathy are seen throughout the rotator cuff. There is a superimposed full-thickness tear of the distal anterior fibers of the supraspinatus component. The tear measures approximately 5 mm. Increased overlying bursal fluid is noted. The biceps anchor is intact. There is no evidence for complete biceps tendon tear or distal retraction. The tendon is identified in the expected position in the intertubercular sulcus. There is abnormal signal involving the proximal tendon suggesting tendinopathy and partial-thickness longitudinal split tear. The glenohumeral joint is intact. Moderate changes of osteoarthritis are noted. There is abnormal signal and irregularity throughout the labrum likely related to chronic degenerative type changes. No definitive SLAP type tear is seen. There is no joint  effusion. Slightly increased subacromial/subdeltoid bursal fluid is noted. The acromioclavicular joint is intact. Mild hypertrophic age-related changes are noted. No significant focal abnormal bone marrow signal is seen. The cortical margins are grossly intact. There is mild atrophy throughout the rotator cuff musculature.      Impression: Impression: 1.Changes of tendinopathy are seen throughout the rotator cuff. There is a superimposed full-thickness tear of the distal anterior fibers of the supraspinatus component measuring approximately 5 mm. Increased overlying bursal fluid is noted. 2.No evidence for complete biceps tendon tear or distal retraction. There is evidence for tendinopathy and partial-thickness longitudinal split tear of the proximal tendon. 3.Moderate osteoarthritis of the glenohumeral joint. Associated chronic degenerative type changes of the labrum are noted. Electronically Signed: Andrews Palma MD  1/23/2025 10:02 AM EST  Workstation ID: [id number]             Assessment  Assessment and Plan      Diagnoses and all orders for this visit:     1. Left shoulder pain, unspecified chronicity (Primary)     2. Impingement syndrome of left shoulder     3. Incomplete tear of left rotator cuff, unspecified whether traumatic           Discussed the treatment plan with the patient. I reviewed the MRI results with the patient.      Discussed the treatment options with the patient, operative vs non-operative. The patient is a candidate for a left shoulder arthroscopy whenever he is ready.      Discussed conservative measures as injections, therapy and anti inflammatory.       The patient expressed understanding and wished to proceed with a left shoulder arthroscopy.       Discussed surgery., Risks/benefits discussed with patient including, but not limited to: infection, bleeding, neurovascular damage, re-rupture, aesthetic deformity, need for further surgery, and death., Surgery pamphlet given., and Call or  return if worsening symptoms.     Follow Up      2 weeks postoperatively

## 2025-03-06 NOTE — ANESTHESIA PREPROCEDURE EVALUATION
Anesthesia Evaluation     Patient summary reviewed and Nursing notes reviewed   no history of anesthetic complications:   NPO Solid Status: > 8 hours  NPO Liquid Status: > 2 hours           Airway   Mallampati: III  TM distance: <3 FB  Neck ROM: full  No difficulty expected and Large neck circumference  Dental - normal exam         Pulmonary - normal exam    breath sounds clear to auscultation  (+) ,sleep apnea on CPAP  Cardiovascular - normal exam  Exercise tolerance: good (4-7 METS)    Rhythm: regular  Rate: normal    (+) hypertension, hyperlipidemia      Neuro/Psych- negative ROS  GI/Hepatic/Renal/Endo    (+) GERD well controlled    Musculoskeletal     Abdominal    Substance History - negative use     OB/GYN negative ob/gyn ROS         Other   arthritis,     ROS/Med Hx Other: >4METS.HX ATYPICAL C/P,HTN,HLD,RAÚL,GERD.   ECHO 11/23 EF 58%,NORMAL CTA. CARDS OV 2/5/25.   CARDS CLEARANCE LOW RISK 2/24/25. KT     Cardiac CT score of 0              Anesthesia Plan    ASA 2     general and regional     (Patient understands anesthesia not responsible for dental damage. Regional anesthesia options discussed with patient. Pt accepts regional block.)  intravenous induction     Anesthetic plan, risks, benefits, and alternatives have been provided, discussed and informed consent has been obtained with: patient.    Use of blood products discussed with patient .    Plan discussed with CRNA.    CODE STATUS:

## 2025-03-06 NOTE — OP NOTE
"Chief Complaint  Follow-up of the Left Shoulder        Subjective  Luis Perry presents to [facility] for follow up of the left shoulder.  He had a MRI and is here to review.  . He had a left shoulder steroid injection on 10/25/24.  He reports no specific injury, trauma, falls or surgery on his left shoulder. He has pain with overhead range of motion, lifting, pulling and reaching with his shoulder. He has been taking over the counter medications for pain control.  He notes the pain wakes him up while sleeping.  He has done home exercises that has failed.       Allergies         Allergies   Allergen Reactions    Cefdinir Rash, Swelling and Unknown (See Comments)    Penicillins Hives, Itching, Rash, Shortness Of Breath, Swelling and Unknown (See Comments)    Meloxicam Itching       \"I had a reaction to it\"            Social History   Social History            Socioeconomic History    Marital status:    Tobacco Use    Smoking status: Never    Smokeless tobacco: Never   Vaping Use    Vaping status: Never Used   Substance and Sexual Activity    Alcohol use: Yes       Alcohol/week: 1.0 - 2.0 standard drink of alcohol       Types: 1 - 2 Cans of beer per week       Comment: social    Drug use: Never    Sexual activity: Yes       Partners: Female       Birth control/protection: None            I reviewed the patient's chief complaint, history of present illness, review of systems, past medical history, surgical history, family history, social history, medications, and allergy list.      Review of Systems      Constitutional: Denies fevers, chills, weight loss  Cardiovascular: Denies chest pain, shortness of breath  Skin: Denies rashes, acute skin changes  Neurologic: Denies headache, loss of consciousness        Vital Signs:   /90   Pulse 82   Ht 177.8 cm (70\")   Wt 95.3 kg (210 lb)   SpO2 97%   BMI 30.13 kg/m²           Physical Exam  General: Alert. No acute distress   "   Ortho Exam          Left upper extremity: Forward flexion 170. Abduction 100. External rotation 50. Internal rotation L3. Positive Cross body adduction. Supraspinatus strength 4+/5. Infraspinatus Strength 5/5. Infrared subscap 5/5. Positive Mejia. Positive Neer. Negative Apprehension. Negative Lift off. (Negative Obriens. Sensation intact to light touch, median, radial, ulnar nerve. Positive AIN, PIN, ulnar nerve motor. Positive pulses. Positive Impingement signs. Good strength in triceps, biceps, deltoid, wrist extensors and wrist flexors. Tender to palpation to  the anterior aspect of the shoulder and down the arm.  Pain with empty can testing.          Procedures           Imaging Results (Most Recent)         None                   Result Review  :            MRI Shoulder Left Without Contrast     Result Date: 1/23/2025  Narrative: MRI SHOULDER LEFT WO CONTRAST Date of Exam: 1/22/2025 4:59 PM EST Indication: bursitis of left shoulder.  Comparison: None available. Technique:  Routine multiplanar/multisequence images of the left shoulder were obtained without contrast administration.  Findings: Changes of tendinopathy are seen throughout the rotator cuff. There is a superimposed full-thickness tear of the distal anterior fibers of the supraspinatus component. The tear measures approximately 5 mm. Increased overlying bursal fluid is noted. The biceps anchor is intact. There is no evidence for complete biceps tendon tear or distal retraction. The tendon is identified in the expected position in the intertubercular sulcus. There is abnormal signal involving the proximal tendon suggesting tendinopathy and partial-thickness longitudinal split tear. The glenohumeral joint is intact. Moderate changes of osteoarthritis are noted. There is abnormal signal and irregularity throughout the labrum likely related to chronic degenerative type changes. No definitive SLAP type tear is seen. There is no joint effusion. Slightly  increased subacromial/subdeltoid bursal fluid is noted. The acromioclavicular joint is intact. Mild hypertrophic age-related changes are noted. No significant focal abnormal bone marrow signal is seen. The cortical margins are grossly intact. There is mild atrophy throughout the rotator cuff musculature.      Impression: Impression: 1.Changes of tendinopathy are seen throughout the rotator cuff. There is a superimposed full-thickness tear of the distal anterior fibers of the supraspinatus component measuring approximately 5 mm. Increased overlying bursal fluid is noted. 2.No evidence for complete biceps tendon tear or distal retraction. There is evidence for tendinopathy and partial-thickness longitudinal split tear of the proximal tendon. 3.Moderate osteoarthritis of the glenohumeral joint. Associated chronic degenerative type changes of the labrum are noted. Electronically Signed: Andrews Palma MD  1/23/2025 10:02 AM EST  Workstation ID: [id number]             Assessment  Assessment and Plan      Diagnoses and all orders for this visit:     1. Left shoulder pain, unspecified chronicity (Primary)     2. Impingement syndrome of left shoulder     3. Incomplete tear of left rotator cuff, unspecified whether traumatic           Discussed the treatment plan with the patient. I reviewed the MRI results with the patient.      Discussed the treatment options with the patient, operative vs non-operative. The patient is a candidate for a left shoulder arthroscopy whenever he is ready.      Discussed conservative measures as injections, therapy and anti inflammatory.       The patient expressed understanding and wished to proceed with a left shoulder arthroscopy.       Discussed surgery., Risks/benefits discussed with patient including, but not limited to: infection, bleeding, neurovascular damage, re-rupture, aesthetic deformity, need for further surgery, and death., Surgery pamphlet given., and Call or return if worsening  symptoms.     Follow Up      2 weeks postoperatively

## 2025-03-06 NOTE — ANESTHESIA PROCEDURE NOTES
Peripheral Block      Patient reassessed immediately prior to procedure    Patient location during procedure: pre-op  Start time: 3/6/2025 12:25 PM  Stop time: 3/6/2025 12:30 PM  Reason for block: at surgeon's request and post-op pain management  Performed by  Anesthesiologist: Rambo Herron MD  Preanesthetic Checklist  Completed: patient identified, IV checked, site marked, risks and benefits discussed, surgical consent, monitors and equipment checked, pre-op evaluation and timeout performed  Prep:  Pt Position: supine (HOB elevated)  Sterile barriers:cap, washed/disinfected hands, sterile barriers, gloves, mask, partial drape and alcohol skin prep  Prep: ChloraPrep  Patient monitoring: blood pressure monitoring, continuous pulse oximetry and EKG  Procedure    Sedation: yes  Performed under: local infiltration  Guidance:ultrasound guided and nerve stimulator    ULTRASOUND INTERPRETATION.  Using ultrasound guidance a 22 G gauge needle was placed in close proximity to the brachial plexus nerve, at which point, under ultrasound guidance anesthetic was injected in the area of the nerve and spread of the anesthesia was seen on ultrasound in close proximity thereto.  There were no abnormalities seen on ultrasound; a digital image was taken; and the patient tolerated the procedure with no complications. Images:still images obtained, printed/placed on chart    Laterality:left  Block Type:interscalene  Injection Technique:single-shot  Needle Type:echogenic  Needle Gauge:22 G (2in)  Resistance on Injection: none    Medications Used: ropivacaine (NAROPIN) 0.5 % injection - Injection   30 mL - 3/6/2025 12:30:00 PM      Post Assessment  Injection Assessment: negative aspiration for heme, no paresthesia on injection and incremental injection  Patient Tolerance:comfortable throughout block  Complications:no  Additional Notes  The block or continuous infusion is requested by the referring physician for management of  postoperative pain, or pain related to a procedure. Ultrasound guidance (deemed medically necessary). Painless injection, pt was awake and conversant during the procedure without complications. Needle and surrounding structures visualized throughout procedure. No adverse reactions or complications seen during this period. Post-procedure image showed no signs of complication, and anatomy was consistent with an uncomplicated nerve blockade.  Performed by: Rambo Herron MD

## 2025-03-19 ENCOUNTER — OFFICE VISIT (OUTPATIENT)
Dept: ORTHOPEDIC SURGERY | Facility: CLINIC | Age: 60
End: 2025-03-19
Payer: COMMERCIAL

## 2025-03-19 VITALS
HEIGHT: 70 IN | WEIGHT: 207 LBS | SYSTOLIC BLOOD PRESSURE: 148 MMHG | OXYGEN SATURATION: 93 % | HEART RATE: 96 BPM | DIASTOLIC BLOOD PRESSURE: 83 MMHG | BODY MASS INDEX: 29.63 KG/M2

## 2025-03-19 DIAGNOSIS — Z98.890 S/P ARTHROSCOPY OF LEFT SHOULDER: Primary | ICD-10-CM

## 2025-03-19 DIAGNOSIS — M25.512 LEFT SHOULDER PAIN, UNSPECIFIED CHRONICITY: ICD-10-CM

## 2025-03-19 PROCEDURE — 99024 POSTOP FOLLOW-UP VISIT: CPT

## 2025-03-19 NOTE — PROGRESS NOTES
"Chief Complaint  Follow-up of the Left Shoulder    Subjective      Luis Perry presents to [facility]     History of Present Illness  The patient is here today 2 weeks status post left shoulder arthroscopy with rotator cuff repair, subacromial decompression, and distal claviculectomy performed by Dr. Velez on 03/06/2025.    He reports satisfactory pain management overall. However, he experiences abrupt awakenings at night due to severe spasms when attempting to sleep in a recliner or bed with an elevated headrest. These episodes are characterized by a sudden jerk and a sensation of the shoulder tearing loose. The frequency of these episodes has decreased, but the intensity remains significant, as evidenced by a particularly severe episode last night. He has not been attending physical therapy sessions yet and inquires about the possibility of removing the sling while at home, resting the arm on a pillow, and the optimal position for the arm during these periods. His pain management regimen includes hydrocodone at night and ibuprofen during the day.     MEDICATIONS  Hydrocodone, ibuprofen, Aleve.      Allergies   Allergen Reactions    Cefdinir Rash, Swelling and Unknown (See Comments)    Penicillins Hives, Shortness Of Breath, Itching, Swelling and Rash    Meloxicam Itching     \"I had a reaction to it\"       Objective     Vital Signs:   Vitals:    03/19/25 1344   BP: 148/83   Pulse: 96   SpO2: 93%   Weight: 93.9 kg (207 lb)   Height: 177.8 cm (70\")     Body mass index is 29.7 kg/m².    I reviewed the patient's chief complaint, history of present illness, review of systems, past medical history, surgical history, family history, social history, medications, and allergy list.     Ortho Exam  Shoulder Scope   General: Alert, no acute distress  Left upper extremity: Sutures removed today without complications. Arthroscopy portals are well healing without active drainage or redness " noted. No concerning signs of infection. Bruising to the biceps aspect of the arm. Demonstrates intact active elbow flexion and extension. Demonstrates intact active wrist and finger range of motion. Thumb opposition intact. Palmar abduction of the thumb intact. Sensation intact to the axillary, median, radial, and ulnar nerve distributions. Palpable radial pulse.     Physical Exam              Imaging Results (Most Recent)       None             Results           Assessment and Plan   Diagnoses and all orders for this visit:    1. S/P arthroscopy of left shoulder with rotator cuff repair, subacromial decompression and distal claviculectomy (Primary)    2. Left shoulder pain, unspecified chronicity         Assessment & Plan  1. Postoperative status following left shoulder arthroscopy with rotator cuff repair, subacromial decompression, and distal claviculectomy.  The patient's pain is likely due to the reactivation of nerves in the shoulder region, which were previously blocked for pain management. The majority of the block's effect subsides within 48 hours, but some residual effects may persist due to nerve irritation. His surgical incisions are healing well, with some minor irritation from the bandage. He was advised to apply ice to the area before bedtime or prior to lying down to alleviate the nerve response. He was also informed that the pain would improve over time and is not an uncommon occurrence. He was instructed to take pain medication in conjunction with therapy sessions, as increased activity could lead to discomfort. He was advised to continue using ibuprofen and extra strength Tylenol as needed. He was given permission to shower and clean the incisions with soap and water, but was cautioned against submerging the area in water. He was advised to avoid applying lotions, creams, or ointments over the incisions. He was recommended to leave the area exposed to air without any covering. He was informed  that the Steri-Strips would naturally fall off in approximately 10 to 12 days, after which he could remove them if they remain. He was advised to delay physical therapy until 2-3 weeks postoperatively. He was instructed to perform home exercises such as pendulums, table slides, and wall climbs. He was advised to avoid sideways and behind-the-back movements. He was informed that the anchors used for the rotator cuff repair are located in the humerus bone and it would take about 6 weeks for the bone to form over them and secure them, and a total of 12 weeks for complete healing. He was advised to use his right arm to move his left arm passively until the 6-week duncan. He was instructed to wear a sling for the next month, especially when around other people or performing activities that could lead to falls. He was allowed to remove the sling when resting at home. He was advised to apply ice as needed, particularly after exercises and before bedtime. He was provided with the contact information for the physical therapy department and was advised to schedule an appointment in 2 to 3 weeks. He was instructed to position his arm in a rested position when removing the sling at home.    Follow-up  The patient is scheduled for a follow-up visit in 1 month.    PROCEDURE  The patient underwent left shoulder arthroscopy with rotator cuff repair, subacromial decompression, and distal claviculectomy on 03/06/2025.      Follow up 1 month for reevaluation.        Tobacco Use: Low Risk  (3/19/2025)    Patient History     Smoking Tobacco Use: Never     Smokeless Tobacco Use: Never     Passive Exposure: Not on file     Patient reports that they are a nonsmoker; cessation education not applicable.           Follow Up   Return in about 1 month (around 4/19/2025).  There are no Patient Instructions on file for this visit.    Patient was given instructions and counseling regarding his condition or for health maintenance advice. Please see  specific information pulled into the AVS if appropriate.     Patient or patient representative verbalized consent for the use of Ambient Listening during the visit with  GEORGE Go for chart documentation. 3/19/2025  14:47 EDT    Dictated Utilizing Dragon Dictation. Please note that portions of this note were completed with a voice recognition program. Part of this note may be an electronic transcription/translation of spoken language to printed text using the Dragon Dictation System.

## 2025-03-28 DIAGNOSIS — M75.42 IMPINGEMENT SYNDROME OF LEFT SHOULDER: ICD-10-CM

## 2025-03-28 DIAGNOSIS — Z98.890 S/P ARTHROSCOPY OF LEFT SHOULDER: Primary | ICD-10-CM

## 2025-03-28 RX ORDER — HYDROCODONE BITARTRATE AND ACETAMINOPHEN 7.5; 325 MG/1; MG/1
1 TABLET ORAL EVERY 6 HOURS PRN
Qty: 45 TABLET | Refills: 0 | Status: SHIPPED | OUTPATIENT
Start: 2025-03-28

## 2025-03-28 NOTE — TELEPHONE ENCOUNTER
Caller: MARK     Relationship: SELF    Best call back number: [phone] (home)       Requested Prescriptions:   Requested Prescriptions     Pending Prescriptions Disp Refills    HYDROcodone-acetaminophen (NORCO) 7.5-325 MG per tablet 45 tablet 0     Sig: Take 1 tablet by mouth Every 6 (Six) Hours As Needed for Moderate Pain.        Pharmacy where request should be sent: [facility] - [place], [place] - [address] [phone] [facility] [phone]      Last office visit with prescribing clinician: 3/19/2025   Last telemedicine visit with prescribing clinician: Visit date not found   Next office visit with prescribing clinician: 4/25/2025     Additional details provided by patient: PATIENT ASKING WHEN HE CAN START DRIVING AGAIN      Does the patient have less than a 3 day supply:  [x] Yes  [] No    Would you like a call back once the refill request has been completed: [x] Yes [] No    If the office needs to give you a call back, can they leave a voicemail: [x] Yes [] No    Caryn Hearn, SULEMA   03/28/25 15:02 EDT

## 2025-04-25 ENCOUNTER — OFFICE VISIT (OUTPATIENT)
Dept: ORTHOPEDIC SURGERY | Facility: CLINIC | Age: 60
End: 2025-04-25
Payer: COMMERCIAL

## 2025-04-25 VITALS — OXYGEN SATURATION: 93 % | DIASTOLIC BLOOD PRESSURE: 89 MMHG | SYSTOLIC BLOOD PRESSURE: 179 MMHG | HEART RATE: 101 BPM

## 2025-04-25 DIAGNOSIS — Z98.890 S/P ARTHROSCOPY OF LEFT SHOULDER: Primary | ICD-10-CM

## 2025-04-25 DIAGNOSIS — M25.512 LEFT SHOULDER PAIN, UNSPECIFIED CHRONICITY: ICD-10-CM

## 2025-04-25 NOTE — PROGRESS NOTES
"Chief Complaint  Follow-up of the Left Shoulder    Subjective      Luis Perry presents to [facility]     History of Present Illness  The patient presents for evaluation of right shoulder pain.    Significant improvement in his condition is reported, with the initial two weeks post-injury being the most challenging. Mild discomfort is experienced, which intensifies during certain movements or physical therapy sessions. Pain is managed with Tylenol, and occasionally stronger analgesics are used, as was the case on 04/21/2025 following a strenuous therapy session. However, no medication was taken on 04/22/2025 due to the desire to resume driving. Adherence to prescribed home exercises and attendance at physical therapy twice weekly is noted. The use of a pulley in his routine allows him to achieve nearly full range of motion. Swelling and tightness in the shoulder are observed, particularly when wearing a sling. Ice is applied to the affected area, and he continues to sleep in an upright position. As a , he is considering returning to work next Monday, provided he can do so with certain restrictions.    SOCIAL HISTORY  Occupations: Works as a .      Allergies   Allergen Reactions    Cefdinir Rash, Swelling and Unknown (See Comments)    Penicillins Hives, Shortness Of Breath, Itching, Swelling and Rash    Meloxicam Itching     \"I had a reaction to it\"       Objective     Vital Signs:   Vitals:    04/25/25 1550   BP: 179/89   Pulse: 101   SpO2: 93%   PainSc: 6      There is no height or weight on file to calculate BMI.    I reviewed the patient's chief complaint, history of present illness, review of systems, past medical history, surgical history, family history, social history, medications, and allergy list.     Ortho Exam  Shoulder   General: Alert. No acute distress.  Right upper Extremity: tender to palpation. No skin discoloration, atrophy, or " swelling. 110 degrees active elevation.  Passive forward shoulder elevation 140 degrees.  External rotation to 35 degrees.  Demonstrates intact active elbow ROM. Demonstrates intact active wrist ROM. Sensation intact. Palpable radial pulse. Neurovascularly intact.                Imaging Results (Most Recent)       None             Results           Assessment and Plan   Diagnoses and all orders for this visit:    1. S/P arthroscopy of left shoulder (Primary)    2. Left shoulder pain, unspecified chronicity           Assessment & Plan  1. Postoperative status of the right shoulder:  Range of motion is excellent, particularly with passive movements. Advised to discontinue the use of the sling, except in large social gatherings for protection, if needed. Instructed to avoid lifting anything >15 pounds close to the body and to refrain from any overhead activities. May commence pushing and pulling exercises with bands during physical therapy sessions. Encouraged to continue home exercises and physical therapy regimen. Reassured that driving is permissible if pain medication is not required prior to therapy sessions. Advised to apply ice to the affected area and take ibuprofen, Tylenol, or other pain medication as needed. Can return to work on light duty with restrictions, including a 5-pound lifting limit and no overhead work, pushing, or pulling.    Follow-up visit scheduled in 6 weeks.        Return in about 6 weeks (around 6/6/2025).  There are no Patient Instructions on file for this visit.    Patient was given instructions and counseling regarding his condition or for health maintenance advice. Please see specific information pulled into the AVS if appropriate.     Patient or patient representative verbalized consent for the use of Ambient Listening during the visit with  GEORGE Go for chart documentation. 4/27/2025  21:59 EDT    Dictated Utilizing Dragon Dictation. Please note that portions of this note  were completed with a voice recognition program. Part of this note may be an electronic transcription/translation of spoken language to printed text using the Dragon Dictation System.

## 2025-05-01 ENCOUNTER — TELEPHONE (OUTPATIENT)
Dept: ORTHOPEDIC SURGERY | Facility: CLINIC | Age: 60
End: 2025-05-01

## 2025-05-01 NOTE — TELEPHONE ENCOUNTER
"  Caller: Luis Perry \"Bossman\"    Relationship: Self    Best call back number: [phone] (home)       What form or medical record are you requesting: NOTE TO KEEP PATIENT OFF WORK; STATES IT IS TOO MUCH AT THIS TIME    Who is requesting this form or medical record from you: PATIENT    How would you like to receive the form or medical records (pick-up, mail, fax): UPLOAD TO [employer]    PLEASE CALL PATIENT ONCE UPLOADED  "

## 2025-05-22 ENCOUNTER — TELEPHONE (OUTPATIENT)
Dept: ORTHOPEDIC SURGERY | Facility: CLINIC | Age: 60
End: 2025-05-22
Payer: COMMERCIAL

## 2025-05-22 NOTE — TELEPHONE ENCOUNTER
Called patient he will call us back regarding his appt tomorrow at 3 pm, if he can come at 10:30 or 12 per Niharika. Hub ok to relay

## 2025-05-22 NOTE — TELEPHONE ENCOUNTER
Pt stated he CAN'T COME IN ANY EARLIER SO HE WILL SEE JOHNATHAN AT 3:00 DUE TO HIS WORK SCHEDULE THAT WHY HE SCHEDULED IT FOR 3:00

## 2025-05-23 ENCOUNTER — OFFICE VISIT (OUTPATIENT)
Dept: ORTHOPEDIC SURGERY | Facility: CLINIC | Age: 60
End: 2025-05-23
Payer: COMMERCIAL

## 2025-05-23 VITALS
BODY MASS INDEX: 29.63 KG/M2 | WEIGHT: 207 LBS | SYSTOLIC BLOOD PRESSURE: 159 MMHG | HEIGHT: 70 IN | HEART RATE: 94 BPM | OXYGEN SATURATION: 97 % | DIASTOLIC BLOOD PRESSURE: 93 MMHG

## 2025-05-23 DIAGNOSIS — Z98.890 S/P ARTHROSCOPY OF LEFT SHOULDER: Primary | ICD-10-CM

## 2025-05-23 RX ORDER — AMLODIPINE BESYLATE 5 MG/1
5 TABLET ORAL DAILY
COMMUNITY
Start: 2025-04-25

## 2025-05-23 NOTE — PROGRESS NOTES
"Chief Complaint  Follow-up of the Left Shoulder     Subjective      Luis Perry presents to [facility] for follow up of the left shoulder.  He has been going to [facility].  He notes he is doing a whole lot better. He had a left shoulder arthroscopy with rotator cuff repair, subacromial decompression, and distal claviculectomy performed  on 03/06/2025. He is still lacking 20 degrees or so.      Allergies   Allergen Reactions    Cefdinir Rash, Swelling and Unknown (See Comments)    Penicillins Hives, Shortness Of Breath, Itching, Swelling and Rash    Meloxicam Itching     \"I had a reaction to it\"        Social History     Socioeconomic History    Marital status:    Tobacco Use    Smoking status: Never    Smokeless tobacco: Never   Vaping Use    Vaping status: Never Used   Substance and Sexual Activity    Alcohol use: Yes     Alcohol/week: 1.0 - 2.0 standard drink of alcohol     Types: 1 - 2 Cans of beer per week     Comment: social    Drug use: Never    Sexual activity: Yes     Partners: Female     Birth control/protection: None        I reviewed the patient's chief complaint, history of present illness, review of systems, past medical history, surgical history, family history, social history, medications, and allergy list.     Review of Systems     Constitutional: Denies fevers, chills, weight loss  Cardiovascular: Denies chest pain, shortness of breath  Skin: Denies rashes, acute skin changes  Neurologic: Denies headache, loss of consciousness        Vital Signs:   /93   Pulse 94   Ht 177.8 cm (70\")   Wt 93.9 kg (207 lb)   SpO2 97%   BMI 29.70 kg/m²          Physical Exam  General: Alert. No acute distress    Ortho Exam        LEFT SHOULDER Forward flexion 160. Abduction 90. External rotation 45. Internal rotation SI joint.   Sensation intact to light touch, median, radial, ulnar nerve. Positive AIN, PIN, ulnar nerve motor. Positive pulses. Demonstrates intact active " wrist and finger range of motion. Thumb opposition intact. Palmar abduction of the thumb intact. Sensation intact to the axillary, median, radial, and ulnar nerve distributions. Palpable radial pulse     Procedures        Imaging Results (Most Recent)       None             Result Review :           Assessment and Plan     Diagnoses and all orders for this visit:    1. S/P arthroscopy of left shoulder (Primary)        Discussed the treatment plan with the patient.     Continue physical therapy and exercises at home.      Work note given.     Call or return if worsening symptoms.    Follow Up     6 weeks to assess the structure.       Patient was given instructions and counseling regarding his condition or for health maintenance advice. Please see specific information pulled into the AVS if appropriate.     Scribed for James Garcia MD by Ericka Winston MA.  05/23/25   14:55 EDT      I have personally performed the services described in this document as scribed by the above individual and it is both accurate and complete. James Garcia MD 05/23/25

## 2025-07-07 ENCOUNTER — OFFICE VISIT (OUTPATIENT)
Dept: ORTHOPEDIC SURGERY | Facility: CLINIC | Age: 60
End: 2025-07-07
Payer: COMMERCIAL

## 2025-07-07 VITALS
SYSTOLIC BLOOD PRESSURE: 161 MMHG | BODY MASS INDEX: 29.63 KG/M2 | DIASTOLIC BLOOD PRESSURE: 91 MMHG | OXYGEN SATURATION: 94 % | WEIGHT: 207 LBS | HEART RATE: 86 BPM | HEIGHT: 70 IN

## 2025-07-07 DIAGNOSIS — Z98.890 S/P ARTHROSCOPY OF LEFT SHOULDER: Primary | ICD-10-CM

## 2025-07-07 PROCEDURE — 99213 OFFICE O/P EST LOW 20 MIN: CPT

## 2025-07-07 NOTE — PROGRESS NOTES
"Chief Complaint  Follow-up of the Left Shoulder    Subjective      Luis Perry presents to [facility]     History of Present Illness  The patient is here today for a follow-up on his left shoulder. He is status post left shoulder rotator cuff repair, subacromial decompression, and distal claviculectomy performed by Dr. Velez on 03/06/2025.    He has been attending physical therapy and reports significant improvement since his last office visit on 05/23/2025. During that visit, he was advised to continue with physical therapy and home exercises. He continues to engage in physical therapy twice a week, which has resulted in improved mobility and reduced pain. However, he occasionally experiences minor discomfort. Last night, he had difficulty sleeping due to an aching sensation when lying on his side. He also notes that his strength is not as robust as it used to be, particularly when reaching behind his back. Despite these challenges, he believes his range of motion has improved compared to 20 years ago. He has been provided with a pulley for home use. He attempted to return to work with restrictions but found it too challenging and had to leave after half a day.      Allergies   Allergen Reactions    Cefdinir Rash, Swelling and Unknown (See Comments)    Penicillins Hives, Shortness Of Breath, Itching, Swelling and Rash    Meloxicam Itching     \"I had a reaction to it\"       Objective     Vital Signs:   Vitals:    07/07/25 1534   BP: 161/91   Pulse: 86   SpO2: 94%   Weight: 93.9 kg (207 lb)   Height: 177.8 cm (70\")     Body mass index is 29.7 kg/m².    I reviewed the patient's chief complaint, history of present illness, review of systems, past medical history, surgical history, family history, social history, medications, and allergy list.     Ortho Exam    Physical Exam  Musculoskeletal:  Left Shoulder: Active forward shoulder elevation 180 degrees, external rotation 55 degrees, " internal rotation reaches mid lumbar spine.             Imaging Results (Most Recent)       None             Results           Assessment and Plan   Diagnoses and all orders for this visit:    1. S/P arthroscopy of left shoulder (Primary)      Assessment & Plan  Post-operative status following left shoulder rotator cuff repair, subacromial decompression, and distal claviculectomy:    Significant improvement in pain and range of motion is reported, with occasional stiffness and aching, particularly at night. He has been attending physical therapy twice a week and performing home exercises. Continue using the pulley for stretching exercises at home, especially in the morning, to maintain flexibility and prevent stiffness. Be cautious with heavy lifting and use proper body mechanics to avoid re-injury. Discontinue formal physical therapy if ready and contact the clinic if any issues or concerns arise. If stiffness or discomfort increases, consider returning to physical therapy.  Discussed that recovery for rotator cuff repair could take up to a year and for him to continue his home exercises and strength training      Follow up as needed        Tobacco Use: Low Risk  (7/7/2025)    Patient History     Smoking Tobacco Use: Never     Smokeless Tobacco Use: Never     Passive Exposure: Not on file     Patient reports that they are a nonsmoker; cessation education not applicable.                 Follow Up   No follow-ups on file.  There are no Patient Instructions on file for this visit.    Patient was given instructions and counseling regarding his condition or for health maintenance advice. Please see specific information pulled into the AVS if appropriate.     Patient or patient representative verbalized consent for the use of Ambient Listening during the visit with  GEORGE Go for chart documentation. 7/7/2025  16:38 EDT    Dictated Utilizing Dragon Dictation. Please note that portions of this note were  completed with a voice recognition program. Part of this note may be an electronic transcription/translation of spoken language to printed text using the Dragon Dictation System.

## (undated) DEVICE — INTENDED FOR TISSUE SEPARATION, AND OTHER PROCEDURES THAT REQUIRE A SHARP SURGICAL BLADE TO PUNCTURE OR CUT.: Brand: BARD-PARKER ® CARBON RIB-BACK BLADES

## (undated) DEVICE — SHOULDER ARTHROSCOPY-LF: Brand: MEDLINE INDUSTRIES, INC.

## (undated) DEVICE — TBG INFLOW/OUTFLOW DUALWAVE 1P/U

## (undated) DEVICE — DRESSING,GAUZE,XEROFORM,CURAD,1"X8",ST: Brand: CURAD

## (undated) DEVICE — SUT ETHLN 3-0 FS118IN 663H

## (undated) DEVICE — PENCL SMOKE/EVAC MEGADYNE TELESCP 10FT

## (undated) DEVICE — NDL HVY/DUTY SCORPION W/MEGALOADER REUS

## (undated) DEVICE — STERILE POLYISOPRENE POWDER-FREE SURGICAL GLOVES: Brand: PROTEXIS

## (undated) DEVICE — 90-S CRUISE, SUCTION PROBE, NON-BENDABLE, MAX CUT LEVEL 1: Brand: SERFAS ENERGY

## (undated) DEVICE — SLV DISTRACTION STAR VELCRO XL DISP

## (undated) DEVICE — ENCORE® LATEX ORTHO SIZE 8, STERILE LATEX POWDER-FREE SURGICAL GLOVE: Brand: ENCORE

## (undated) DEVICE — GLOVE,SURG,SENSICARE SLT,LF,PF,7: Brand: MEDLINE

## (undated) DEVICE — THE STERILE LIGHT HANDLE COVER IS USED WITH STERIS SURGICAL LIGHTING AND VISUALIZATION SYSTEMS.

## (undated) DEVICE — BLD CUT FORMLA AGGR PLS 4.0MM

## (undated) DEVICE — SUT VIC UD BR COAT 0 CP2 27IN

## (undated) DEVICE — UNDYED BRAIDED (POLYGLACTIN 910), SYNTHETIC ABSORBABLE SUTURE: Brand: COATED VICRYL

## (undated) DEVICE — BUR BRL FORMLA 12FLUT 4MM

## (undated) DEVICE — STERILE POLYISOPRENE POWDER-FREE SURGICAL GLOVES WITH EMOLLIENT COATING: Brand: PROTEXIS

## (undated) DEVICE — [THREADED CANNULA.  DO NOT RESTERILIZE,  DO NOT USE IF PACKAGE IS DAMAGED]: Brand: DRI-LOK